# Patient Record
Sex: MALE | Race: WHITE | NOT HISPANIC OR LATINO | Employment: FULL TIME | URBAN - METROPOLITAN AREA
[De-identification: names, ages, dates, MRNs, and addresses within clinical notes are randomized per-mention and may not be internally consistent; named-entity substitution may affect disease eponyms.]

---

## 2018-02-28 ENCOUNTER — OFFICE VISIT (OUTPATIENT)
Dept: FAMILY MEDICINE CLINIC | Facility: CLINIC | Age: 23
End: 2018-02-28
Payer: COMMERCIAL

## 2018-02-28 ENCOUNTER — HOSPITAL ENCOUNTER (OUTPATIENT)
Dept: RADIOLOGY | Facility: HOSPITAL | Age: 23
Discharge: HOME | End: 2018-02-28
Payer: COMMERCIAL

## 2018-02-28 VITALS
WEIGHT: 199.6 LBS | DIASTOLIC BLOOD PRESSURE: 64 MMHG | SYSTOLIC BLOOD PRESSURE: 118 MMHG | RESPIRATION RATE: 16 BRPM | BODY MASS INDEX: 24.82 KG/M2 | HEIGHT: 75 IN | HEART RATE: 80 BPM

## 2018-02-28 DIAGNOSIS — R07.89 ANTERIOR CHEST WALL PAIN: ICD-10-CM

## 2018-02-28 DIAGNOSIS — R07.9 CHEST PAIN, UNSPECIFIED TYPE: ICD-10-CM

## 2018-02-28 DIAGNOSIS — R07.9 CHEST PAIN, UNSPECIFIED TYPE: Primary | ICD-10-CM

## 2018-02-28 PROCEDURE — 71046 X-RAY EXAM CHEST 2 VIEWS: CPT

## 2018-02-28 PROCEDURE — 1036F TOBACCO NON-USER: CPT | Performed by: FAMILY MEDICINE

## 2018-02-28 PROCEDURE — 99203 OFFICE O/P NEW LOW 30 MIN: CPT | Performed by: FAMILY MEDICINE

## 2018-02-28 PROCEDURE — 3008F BODY MASS INDEX DOCD: CPT | Performed by: FAMILY MEDICINE

## 2018-02-28 PROCEDURE — 93000 ELECTROCARDIOGRAM COMPLETE: CPT | Performed by: FAMILY MEDICINE

## 2018-02-28 RX ORDER — CHLORAL HYDRATE 500 MG
1000 CAPSULE ORAL DAILY
COMMUNITY

## 2018-02-28 RX ORDER — IBUPROFEN 800 MG/1
800 TABLET ORAL EVERY 6 HOURS PRN
Qty: 30 TABLET | Refills: 0 | Status: SHIPPED | OUTPATIENT
Start: 2018-02-28

## 2018-02-28 NOTE — PROGRESS NOTES
Assessment/Plan:    1  Chest pain, unspecified type/  Anterior chest wall pain    - POCT ECG normal  - will obtain XR chest pa & lateral and call with results  - rest, avoid lifting more than 5 lbs, use warm compresses 3 times a day  - take ibuprofen (MOTRIN) 800 mg tablet; Take 1 tablet (800 mg total) by mouth every 6 (six) hours as needed for pain  Dispense: 30 tablet  - gradually resume your normal activities when your symptoms improve  - f/u in the office if symptoms persist, go to ER if symptoms worsen  Subjective:   Chief Complaint   Patient presents with    [facility] [place] [facility]    Chest Pain      Patient ID: Ronald Mckeon is a 20 y o  male  Chest Pain    This is a new problem  The current episode started in the past 7 days  The onset quality is gradual  The problem occurs intermittently  The pain is present in the lateral region (left anterolateral)  The quality of the pain is described as dull and tightness  Pertinent negatives include no abdominal pain, back pain, cough, diaphoresis, dizziness, exertional chest pressure, fever, headaches, leg pain, lower extremity edema, malaise/fatigue, nausea, numbness, orthopnea, palpitations, shortness of breath, sputum production, syncope or vomiting  The pain is aggravated by coughing, deep breathing, movement and lifting  He has tried acetaminophen for the symptoms  Pt denies any chest wall injury, he lifts weights, but has not been exercising/ lifting weights since Thursday and his pain started on Sunday  Pt drove to [place] on Saturday, denies any leg swelling or SOB, no recent surgeries or prolonged periods of immobility  He had a flu 1 month ago, but his symptoms had resolved 3 weeks ago  History reviewed  No pertinent past medical history    Past Surgical History:   Procedure Laterality Date    SHOULDER SURGERY       Family History   Problem Relation Age of Onset    Heart disease Mother     No Known Problems Father     Substance Abuse Neg Hx     Mental illness Neg Hx      Social History     Social History    Marital status: Single     Spouse name: N/A    Number of children: N/A    Years of education: N/A     Occupational History    Not on file  Social History Main Topics    Smoking status: Former Smoker    Smokeless tobacco: Never Used    Alcohol use Yes    Drug use: No    Sexual activity: Not on file     Other Topics Concern    Not on file     Social History Narrative    No narrative on file       Current Outpatient Prescriptions:     Omega-3 Fatty Acids (FISH OIL) 1,000 mg, Take 1,000 mg by mouth daily, Disp: , Rfl:     ibuprofen (MOTRIN) 800 mg tablet, Take 1 tablet (800 mg total) by mouth every 6 (six) hours as needed for mild pain, Disp: 30 tablet, Rfl: 0    Review of Systems   Constitutional: Negative for chills, diaphoresis, fatigue, fever and malaise/fatigue  HENT: Negative for congestion, ear pain, postnasal drip, sinus pain, sore throat and trouble swallowing  Respiratory: Negative for cough, sputum production, shortness of breath and wheezing  Cardiovascular: Positive for chest pain  Negative for palpitations, orthopnea, leg swelling and syncope  Gastrointestinal: Negative for abdominal pain, blood in stool, diarrhea, nausea and vomiting  Genitourinary: Negative for dysuria, hematuria and urgency  Musculoskeletal: Negative for back pain, joint swelling and neck pain  Skin: Negative for rash  Neurological: Negative for dizziness, numbness and headaches  Hematological: Negative for adenopathy  Objective:    Vitals:    02/28/18 1434   BP: 118/64   BP Location: Left arm   Patient Position: Sitting   Cuff Size: Adult   Pulse: 80   Resp: 16   Weight: 90 5 kg (199 lb 9 6 oz)   Height: 6' 2 75" (1 899 m)        Physical Exam   Constitutional: He is oriented to person, place, and time  He appears well-developed and well-nourished  No distress  Neck: Neck supple  No thyromegaly present  Cardiovascular: Normal rate, regular rhythm and normal heart sounds  No murmur heard  Pulmonary/Chest: Effort normal and breath sounds normal  No respiratory distress  He has no wheezes  He has no rhonchi  He has no rales  Tenderness over left mid ribs anterolateral aspect, no parasternal tenderness, no skin rash   Abdominal: Soft  He exhibits no distension and no mass  There is no hepatosplenomegaly  There is no tenderness  There is no CVA tenderness  Musculoskeletal: He exhibits no edema  Lymphadenopathy:     He has no cervical adenopathy  Neurological: He is alert and oriented to person, place, and time  Psychiatric: He has a normal mood and affect   His behavior is normal  Thought content normal          Recent Results (from the past 168 hour(s))   POCT ECG    Collection Time: 02/28/18  2:40 PM   Result Value Ref Range    OTHER       EKG: normal sinus rhythm, no acute ischemia, no previous tracings available for comparison

## 2018-03-02 DIAGNOSIS — R07.89 CHEST WALL PAIN: Primary | ICD-10-CM

## 2018-03-02 RX ORDER — TRAMADOL HYDROCHLORIDE 50 MG/1
50 TABLET ORAL EVERY 6 HOURS PRN
Qty: 30 TABLET | Refills: 0 | Status: SHIPPED | OUTPATIENT
Start: 2018-03-02 | End: 2018-03-02 | Stop reason: SDUPTHER

## 2018-03-02 RX ORDER — TRAMADOL HYDROCHLORIDE 50 MG/1
50 TABLET ORAL EVERY 6 HOURS PRN
Qty: 12 TABLET | Refills: 0 | Status: SHIPPED | OUTPATIENT
Start: 2018-03-02

## 2021-08-23 ENCOUNTER — APPOINTMENT (OUTPATIENT)
Dept: PULMONOLOGY | Facility: CLINIC | Age: 26
End: 2021-08-23
Payer: COMMERCIAL

## 2021-08-23 VITALS
DIASTOLIC BLOOD PRESSURE: 73 MMHG | OXYGEN SATURATION: 98 % | BODY MASS INDEX: 24.36 KG/M2 | TEMPERATURE: 98 F | HEART RATE: 84 BPM | WEIGHT: 200 LBS | HEIGHT: 76 IN | SYSTOLIC BLOOD PRESSURE: 123 MMHG

## 2021-08-23 DIAGNOSIS — R06.00 DYSPNEA, UNSPECIFIED: ICD-10-CM

## 2021-08-23 DIAGNOSIS — Z78.9 OTHER SPECIFIED HEALTH STATUS: ICD-10-CM

## 2021-08-23 PROBLEM — Z00.00 ENCOUNTER FOR PREVENTIVE HEALTH EXAMINATION: Status: ACTIVE | Noted: 2021-08-23

## 2021-08-23 LAB — DEPRECATED D DIMER PPP IA-ACNC: <150 NG/ML DDU

## 2021-08-23 PROCEDURE — 36415 COLL VENOUS BLD VENIPUNCTURE: CPT

## 2021-08-23 PROCEDURE — 71046 X-RAY EXAM CHEST 2 VIEWS: CPT

## 2021-08-23 PROCEDURE — 99204 OFFICE O/P NEW MOD 45 MIN: CPT | Mod: 25

## 2021-08-24 PROBLEM — R06.00 DYSPNEA ON EFFORT: Status: ACTIVE | Noted: 2021-08-23

## 2021-08-24 LAB
ANION GAP SERPL CALC-SCNC: 11 MMOL/L
BASOPHILS # BLD AUTO: 0.04 K/UL
BASOPHILS NFR BLD AUTO: 0.5 %
BUN SERPL-MCNC: 14 MG/DL
CALCIUM SERPL-MCNC: 9.9 MG/DL
CHLORIDE SERPL-SCNC: 101 MMOL/L
CO2 SERPL-SCNC: 28 MMOL/L
CREAT SERPL-MCNC: 0.98 MG/DL
EOSINOPHIL # BLD AUTO: 0.17 K/UL
EOSINOPHIL NFR BLD AUTO: 2.3 %
GLUCOSE SERPL-MCNC: 76 MG/DL
HCT VFR BLD CALC: 48.2 %
HGB BLD-MCNC: 16.6 G/DL
IMM GRANULOCYTES NFR BLD AUTO: 0.1 %
LYMPHOCYTES # BLD AUTO: 1.45 K/UL
LYMPHOCYTES NFR BLD AUTO: 19.8 %
MAN DIFF?: NORMAL
MCHC RBC-ENTMCNC: 31.4 PG
MCHC RBC-ENTMCNC: 34.4 GM/DL
MCV RBC AUTO: 91.1 FL
MONOCYTES # BLD AUTO: 0.52 K/UL
MONOCYTES NFR BLD AUTO: 7.1 %
NEUTROPHILS # BLD AUTO: 5.13 K/UL
NEUTROPHILS NFR BLD AUTO: 70.2 %
NT-PROBNP SERPL-MCNC: <5 PG/ML
PLATELET # BLD AUTO: 251 K/UL
POTASSIUM SERPL-SCNC: 4.6 MMOL/L
RAPID RVP RESULT: NOT DETECTED
RBC # BLD: 5.29 M/UL
RBC # FLD: 11.9 %
SARS-COV-2 RNA PNL RESP NAA+PROBE: NOT DETECTED
SODIUM SERPL-SCNC: 140 MMOL/L
WBC # FLD AUTO: 7.32 K/UL

## 2021-08-25 ENCOUNTER — APPOINTMENT (OUTPATIENT)
Dept: PULMONOLOGY | Facility: CLINIC | Age: 26
End: 2021-08-25

## 2021-08-26 ENCOUNTER — APPOINTMENT (OUTPATIENT)
Dept: PULMONOLOGY | Facility: CLINIC | Age: 26
End: 2021-08-26
Payer: COMMERCIAL

## 2021-08-26 VITALS
DIASTOLIC BLOOD PRESSURE: 59 MMHG | HEART RATE: 71 BPM | TEMPERATURE: 97.3 F | OXYGEN SATURATION: 96 % | SYSTOLIC BLOOD PRESSURE: 89 MMHG | RESPIRATION RATE: 14 BRPM

## 2021-08-26 PROCEDURE — 94727 GAS DIL/WSHOT DETER LNG VOL: CPT

## 2021-08-26 PROCEDURE — 94729 DIFFUSING CAPACITY: CPT

## 2021-08-26 PROCEDURE — 94010 BREATHING CAPACITY TEST: CPT

## 2021-08-27 ENCOUNTER — APPOINTMENT (OUTPATIENT)
Dept: CT IMAGING | Facility: CLINIC | Age: 26
End: 2021-08-27
Payer: COMMERCIAL

## 2021-08-27 ENCOUNTER — NON-APPOINTMENT (OUTPATIENT)
Age: 26
End: 2021-08-27

## 2021-08-27 ENCOUNTER — OUTPATIENT (OUTPATIENT)
Dept: OUTPATIENT SERVICES | Facility: HOSPITAL | Age: 26
LOS: 1 days | End: 2021-08-27
Payer: COMMERCIAL

## 2021-08-27 ENCOUNTER — RESULT REVIEW (OUTPATIENT)
Age: 26
End: 2021-08-27

## 2021-08-27 DIAGNOSIS — R06.00 DYSPNEA, UNSPECIFIED: ICD-10-CM

## 2021-08-27 PROCEDURE — 71250 CT THORAX DX C-: CPT | Mod: 26

## 2021-08-27 PROCEDURE — 71250 CT THORAX DX C-: CPT

## 2021-09-07 PROBLEM — Z00.00 ENCOUNTER FOR PREVENTIVE HEALTH EXAMINATION: Status: ACTIVE | Noted: 2021-09-07

## 2021-10-05 ENCOUNTER — APPOINTMENT (OUTPATIENT)
Dept: HEART AND VASCULAR | Facility: CLINIC | Age: 26
End: 2021-10-05